# Patient Record
Sex: FEMALE | Race: WHITE | NOT HISPANIC OR LATINO | Employment: UNEMPLOYED | URBAN - METROPOLITAN AREA
[De-identification: names, ages, dates, MRNs, and addresses within clinical notes are randomized per-mention and may not be internally consistent; named-entity substitution may affect disease eponyms.]

---

## 2019-11-08 ENCOUNTER — OFFICE VISIT CONVERTED (OUTPATIENT)
Dept: CARDIOLOGY | Facility: CLINIC | Age: 24
End: 2019-11-08
Attending: INTERNAL MEDICINE

## 2019-11-08 ENCOUNTER — CONVERSION ENCOUNTER (OUTPATIENT)
Dept: CARDIOLOGY | Facility: CLINIC | Age: 24
End: 2019-11-08

## 2020-09-17 ENCOUNTER — OFFICE VISIT CONVERTED (OUTPATIENT)
Dept: CARDIOLOGY | Facility: CLINIC | Age: 25
End: 2020-09-17
Attending: INTERNAL MEDICINE

## 2020-09-18 ENCOUNTER — OFFICE VISIT CONVERTED (OUTPATIENT)
Dept: FAMILY MEDICINE CLINIC | Facility: CLINIC | Age: 25
End: 2020-09-18
Attending: FAMILY MEDICINE

## 2021-05-13 NOTE — PROGRESS NOTES
"   Progress Note      Patient Name: Katty Love   Patient ID: [id number]   Sex: Female   YOB: 1995        Visit Date: September 17, 2020    Provider: Theo Green MD   [facility]   Location Address: [address] [place] [address], [address]   [place]  [id number]   Location Phone: [phone]          Chief Complaint  · Atrial flutter       History Of Present Illness  REFERRING CARE PROVIDER:   Katty Love is a 25 year old /White female with paroxysmal atrial flutter, previous cardioversion. No symptomatic recurrences. Occasional palpitation issues.   PAST MEDICAL HISTORY: Atrial flutter.   FAMILY HISTORY: Negative for diabetes, hypertension and heart disease.   PSYCHOSOCIAL HISTORY: No history of mood changes or depression. She rarely drinks alcohol and never used tobacco.   CURRENT MEDICATIONS: None.       Review of Systems  · Cardiovascular  o Admits  o : palpitations (fast, fluttering, or skipping beats)  o Denies  o : swelling (feet, ankles, hands), shortness of breath while walking or lying flat, chest pain or angina pectoris   · Respiratory  o Denies  o : chronic or frequent cough, asthma or wheezing      Vitals  Date Time BP Position Site L\R Cuff Size HR RR TEMP (F) WT  HT  BMI kg/m2 BSA m2 O2 Sat HC       09/17/2020 08:27 /76 Sitting    70 - R   187lbs 0oz 5'  7\" 29.29 2           Physical Examination  · Constitutional  o Appearance  o : Awake, alert, in no acute distress.   · Eyes  o Conjunctivae  o : Normal.  · Ears, Nose, Mouth and Throat  o Oral Cavity  o :   § Oral Mucosa  § : Normal.  · Neck  o Inspection/Palpation  o : No JVD. Good carotid upstroke. No thyromegaly.  · Respiratory  o Respiratory  o : Good respiratory effort. Clear to percussion and auscultation.  · Cardiovascular  o Heart  o :   § Auscultation of Heart  § : S1, S2 normal. Regular rate and rhythm without murmurs, gallops, or rubs.  o Peripheral Vascular System  o : "   § Extremities  § : Good femoral and pedal pulses. No pedal edema.  · Gastrointestinal  o Abdominal Examination  o : Soft. No tenderness or masses felt. No hepatosplenomegaly. Abdominal aorta is not palpable.     EKG was performed in the office today.  Indication:       palpitations.  Results:          normal sinus rhythm.  Comparison:    No change from prior EKGs.             Assessment     ASSESSMENT AND PLAN:    Paroxysmal atrial flutter:  No recurrent episodes.  Just continue with monitoring.  Encouraged exercise.    MD Abi Bradley/maryjane         This note was transcribed by Sanjuana Javier.  maryjane/abi   The above service was transcribed by Sanjuana Javier, and I attest to the accuracy of the note.  ABI.         Plan  · Medications  o Medications have been Reconciled  o Transition of Care or Provider Policy            Electronically Signed by: Sanjuana Javier-, -Author on September 23, 2020 05:44:07 AM  Electronically Co-signed by: Theo Green MD -Reviewer on September 23, 2020 10:05:18 AM

## 2021-05-13 NOTE — PROGRESS NOTES
Progress Note      Patient Name: Katty Love   Patient ID: [id number]   Sex: Female   YOB: 1995    Primary Care Provider: Mannie Lockett DO   Referring Provider: Mannie Lockett DO    Visit Date: September 18, 2020    Provider: Mannie Lockett DO   Location: [facility]   Location Address: [address] [place] [address] [address] [place] [address]  [id number]   Location Phone: [phone]          Chief Complaint  · Pt here to establish care and wants to have knots on her leg checked out.      History Of Present Illness  Katty Love is a 25 year old /White female who presents for evaluation and treatment of:        Patient presents establish care complaining of a few small bumps on her lower extremity.  One is on her right thigh one on her left hip that is been there for over 5 years all round like a cyst no pain redness has been unchanged since she noticed to have years ago.    Other past medical history is atrial flutter follows with cardiology has had cardioversion and was on Xarelto in the past follows with Dr. Green.  No problems currently on the medications and denies any palpitation chest pain shortness of breath or other.    She is , is at home with her children 1 is a stepchild that stays with her mother and they visit on the weekends, 12 6 and 2 years old.  No tobacco or substance use       Past Medical History  Disease Name Date Onset Notes   Allergies --  --    Atrial flutter --  --    Screening Mammogram 2019 --          Past Surgical History  Procedure Name Date Notes   Tubal ligation 2018 --        Allergies Reconciled  Social History  Finding Status Start/Stop Quantity Notes   Alcohol Current some day --/-- --  --    Homemaker --  --/-- --  --    lives with children --  --/-- --  --    lives with spouse --  --/-- --  --    Substance Abuse Never --/-- --  --    Tobacco Never --/-- --  --          Review of  "Systems  · Constitutional  o Denies  o : fatigue, night sweats  · Eyes  o Denies  o : double vision, blurred vision  · HENT  o Denies  o : vertigo, recent head injury  · Breasts  o Denies  o : abnormal changes in breast size, additional breast symptoms except as noted in the HPI  · Cardiovascular  o Denies  o : chest pain, irregular heart beats  · Respiratory  o Denies  o : shortness of breath, productive cough  · Gastrointestinal  o Denies  o : nausea, vomiting  · Genitourinary  o Denies  o : dysuria, urinary retention  · Integument  o Denies  o : hair growth change, new skin lesions  · Neurologic  o Denies  o : altered mental status, seizures  · Musculoskeletal  o Denies  o : joint swelling, limitation of motion  · Endocrine  o Denies  o : cold intolerance, heat intolerance  · Heme-Lymph  o Denies  o : petechiae, lymph node enlargement or tenderness  · Allergic-Immunologic  o Denies  o : frequent illnesses      Vitals  Date Time BP Position Site L\R Cuff Size HR RR TEMP (F) WT  HT  BMI kg/m2 BSA m2 O2 Sat        09/18/2020 09:26 /77 Sitting    86 - R 16 97.5 188lbs 8oz 5'  7\" 29.52 2.01 94 %          Physical Examination  · Constitutional  o Appearance  o : no acute distress, well-nourished  · Head and Face  o Head  o :   § Inspection  § : atraumatic, normocephalic  · Eyes  o Eyes  o : extraocular movements intact, no scleral icterus, no conjunctival injection  · Ears, Nose, Mouth and Throat  o Ears  o :   § External Ears  § : normal  o Nose  o :   § Intranasal Exam  § : nares patent  o Oral Cavity  o :   § Oral Mucosa  § : moist mucous membranes  · Neck  o Thyroid  o : gland size normal, nontender, no nodules or masses present on palpation, symmetric  · Respiratory  o Respiratory Effort  o : breathing comfortably, symmetric chest rise  o Auscultation of Lungs  o : clear to asculatation bilaterally, no wheezes, rales, or rhonchii  · Cardiovascular  o Heart  o :   § Auscultation of Heart  § : regular rate " and rhythm, no murmurs, rubs, or gallops  o Peripheral Vascular System  o :   § Extremities  § : no edema  · Lymphatic  o Neck  o : no lymphadenopathy present  · Skin and Subcutaneous Tissue  o General Inspection  o : no lesions present, no areas of discoloration, skin turgor normal  · Neurologic  o Mental Status Examination  o :   § Orientation  § : grossly oriented to person, place and time  o Gait and Station  o :   § Gait Screening  § : normal gait  · Psychiatric  o General  o : normal mood and affect              Assessment  · Screening for depression     V79.0/Z13.89  · Need for influenza vaccination     V04.81/Z23  · Atrial flutter     427.32/I48.92  · History of cardioversion     V15.1/Z98.890  · Benign cyst of skin     706.2/L72.9         Atrial flutter  *Controlled  continue with cardiology    Recommend flu shot today    Cyst on thigh and hip likely, consider ultrasound or follow-up if changes but likely benign since unchanged for 5 years    Follow-up annually for physical, consider labs if not in the past several years CBC CMP TSH lipid    *Up-to-date on Pap smear screening for depression is negative     Problems Reconciled  Plan  · Orders  o ACO-18: Negative screen for clinical depression using a standardized tool () - V79.0/Z13.89 - 09/18/2020  o Fluzone Quadrivalent Vaccine, age 6 months + (35367) - V04.81/Z23 - 09/18/2020   Vaccine - Influenza; Dose: 0.5; Site: Right Deltoid; Route: Intramuscular; Date: 09/18/2020 10:07:00; Exp: 06/30/2021; Lot: [id number]; Mfg: Unknown ; TradeName: Fluarix, quadrivalent, preservative free; Administered By: Otilia Bustos Other; Comment: Pt tolerated well  o Physical, Primary Care Panel (CBC, CMP, Lipid, TSH) [facility] (02686, [id number], 58272, [id number]) - 427.32/I48.92, 706.2/L72.9 - 09/18/2020   in the next year or so  o ACO-39: Current medications updated and reviewed () - 427.32/I48.92, V15.1/Z98.890 - 09/18/2020  o ACO-14: Influenza immunization administered  or previously received () - V04.81/Z23 - 09/18/2020  · Medications  o Medications have been Reconciled  o Transition of Care or Provider Policy  · Instructions  o Depression Screen completed and scanned into the EMR under the designated folder within the patient's documents.  o Today's PHQ-9 result is _0__  o Patient was educated/instructed on their diagnosis, treatment and medications prior to discharge from the clinic today.  o Patient instructed to seek medical attention urgently for new or worsening symptoms.  o Trusted Web sites were provided.  o Call the office with any concerns or questions.  o Bring all medicines with their bottles to each office visit.  o Risks, benefits, and alternatives were discussed with the patient. The patient is aware of risks associated with:  o Chronic conditions reviewed and taken into consideration for today's treatment plan.  o Electronically Identified Patient Education Materials Provided Electronically  · Disposition  o Call or Return if symptoms worsen or persist.  o Labs before follow up ordered  o Return Visit Request in/on 1 year +/- 2 days ([id number]).            Electronically Signed by: Mannie Lockett DO -Author on September 18, 2020 04:42:49 PM

## 2021-05-14 VITALS
SYSTOLIC BLOOD PRESSURE: 122 MMHG | WEIGHT: 188.5 LBS | TEMPERATURE: 97.5 F | BODY MASS INDEX: 29.58 KG/M2 | DIASTOLIC BLOOD PRESSURE: 77 MMHG | OXYGEN SATURATION: 94 % | RESPIRATION RATE: 16 BRPM | HEART RATE: 86 BPM | HEIGHT: 67 IN

## 2021-05-14 VITALS
SYSTOLIC BLOOD PRESSURE: 102 MMHG | BODY MASS INDEX: 29.35 KG/M2 | HEIGHT: 67 IN | DIASTOLIC BLOOD PRESSURE: 76 MMHG | WEIGHT: 187 LBS | HEART RATE: 70 BPM

## 2021-05-15 VITALS
BODY MASS INDEX: 36.12 KG/M2 | SYSTOLIC BLOOD PRESSURE: 130 MMHG | WEIGHT: 184 LBS | DIASTOLIC BLOOD PRESSURE: 78 MMHG | HEART RATE: 90 BPM | HEIGHT: 60 IN

## 2021-09-20 PROBLEM — I48.3 TYPICAL ATRIAL FLUTTER (HCC): Status: ACTIVE | Noted: 2021-09-20

## 2021-09-20 NOTE — PATIENT INSTRUCTIONS
Atrial Flutter    Atrial flutter is a type of abnormal heart rhythm (arrhythmia). The heart has an electrical system that tells it how to beat. In atrial flutter, the signals move rapidly in the top chambers of the heart (the atria). This makes your heart beat very fast. Atrial flutter can come and go, or it can be permanent.  The goal of treatment is to prevent blood clots from forming, control your heart rate, or restore your heartbeat to a normal rhythm. If this condition is not treated, it can cause serious problems, such as a weakened heart muscle (cardiomyopathy) or a stroke.  What are the causes?  This condition is often caused by conditions that damage the heart's electrical system. These include:  · Heart conditions and heart surgery. These include heart attacks and open-heart surgery.  · Lung problems, such as COPD or a blood clot in the lung (pulmonary embolism, or PE).  · Poorly controlled high blood pressure (hypertension).  · Overactive thyroid (hyperthyroidism).  · Diabetes.  In some cases, the cause of this condition is not known.  What increases the risk?  You are more likely to develop this condition if:  · You are an elderly adult.  · You are a man.  · You are overweight (obese).  · You have obstructive sleep apnea.  · You have a family history of atrial flutter.  · You have diabetes.  · You drink a lot of alcohol, especially binge drinking.  · You use drugs, including cannabis.  · You smoke.  What are the signs or symptoms?  Symptoms of this condition include:  · A feeling that your heart is pounding or racing (palpitations).  · Shortness of breath.  · Chest pain.  · Feeling dizzy or light-headed.  · Fainting.  · Low blood pressure (hypotension).  · Fatigue.  · Tiring easily during exercise or activity.  In some cases, there are no symptoms.  How is this diagnosed?  This condition may be diagnosed with:  · An electrocardiogram (ECG) to check electrical signals of the heart.  · An ambulatory  cardiac monitor to record your heart's activity for a few days.  · An echocardiogram to create pictures of your heart.  · A transesophageal echocardiogram (MARGARITA) to create even better pictures of your heart.  · A stress test to check your blood supply while you exercise.  · Imaging tests, such as a CT scan or chest X-ray.  · Blood tests.  How is this treated?  Treatment depends on underlying conditions and how you feel when you experience atrial flutter. This condition may be treated with:  · Medicines to prevent blood clots or to treat heart rate or heart rhythm problems.  · Electrical cardioversion to reset the heart's rhythm.  · Ablation to remove the heart tissue that sends abnormal signals.  · Left atrial appendage closure to seal the area where blood clots can form.  In some cases, underlying conditions will be treated.  Follow these instructions at home:  Medicines  · Take over-the-counter and prescription medicines only as told by your health care provider.  · Do not take any new medicines without talking to your health care provider.  · If you are taking blood thinners:  ? Talk with your health care provider before you take any medicines that contain aspirin or NSAIDs, such as ibuprofen. These medicines increase your risk for dangerous bleeding.  ? Take your medicine exactly as told, at the same time every day.  ? Avoid activities that could cause injury or bruising, and follow instructions about how to prevent falls.  ? Wear a medical alert bracelet or carry a card that lists what medicines you take.  Lifestyle  · Eat heart-healthy foods. Talk with a dietitian to make an eating plan that is right for you.  · Do not use any products that contain nicotine or tobacco, such as cigarettes, e-cigarettes, and chewing tobacco. If you need help quitting, ask your health care provider.  · Do not drink alcohol.  · Do not use drugs, including cannabis.  · Lose weight if you are overweight or obese.  · Exercise  "regularly as instructed by your health care provider.  General instructions  · Do not use diet pills unless your health care provider approves. Diet pills may make heart problems worse.  · If you have obstructive sleep apnea, manage your condition as told by your health care provider.  · Keep all follow-up visits as told by your health care provider. This is important.  Contact a health care provider if you:  · Notice a change in the rate, rhythm, or strength of your heartbeat.  · Are taking a blood thinner and you notice more bruising.  · Have a sudden change in weight.  · Tire more easily when you exercise or do heavy work.  Get help right away if you have:  · Pain or pressure in your chest.  · Shortness of breath.  · Fainting.  · Increasing sweating with no known cause.  · Side effects of blood thinners, such as blood in your vomit, stool, or urine, or bleeding that cannot stop.  · Any symptoms of a stroke. \"BE FAST\" is an easy way to remember the main warning signs of a stroke:  ? B - Balance. Signs are dizziness, sudden trouble walking, or loss of balance.  ? E - Eyes. Signs are trouble seeing or a sudden change in vision.  ? F - Face. Signs are sudden weakness or numbness of the face, or the face or eyelid drooping on one side.  ? A - Arms. Signs are weakness or numbness in an arm. This happens suddenly and usually on one side of the body.  ? S - Speech. Signs are sudden trouble speaking, slurred speech, or trouble understanding what people say.  ? T - Time. Time to call emergency services. Write down what time symptoms started.  · Other signs of a stroke, such as:  ? A sudden, severe headache with no known cause.  ? Nausea or vomiting.  ? Seizure.  · These symptoms may represent a serious problem that is an emergency. Do not wait to see if the symptoms will go away. Get medical help right away. Call your local emergency services (911 in the [place] Do not drive yourself to the hospital.  Summary  · Atrial " flutter is an abnormal heart rhythm that can give you symptoms of palpitations, shortness of breath, or fatigue.  · Atrial flutter is often treated with medicines to keep your heart in a normal rhythm and to prevent a stroke.  · Get help right away if you cannot catch your breath, or have chest pain or pressure.  · Get help right away if you have signs or symptoms of a stroke.  This information is not intended to replace advice given to you by your health care provider. Make sure you discuss any questions you have with your health care provider.  Document Revised: 06/10/2020 Document Reviewed: 06/10/2020  Elsevier Patient Education © 2021 [employer]

## 2021-09-21 ENCOUNTER — OFFICE VISIT (OUTPATIENT)
Dept: CARDIOLOGY | Facility: CLINIC | Age: 26
End: 2021-09-21

## 2021-09-21 VITALS
HEIGHT: 67 IN | HEART RATE: 82 BPM | DIASTOLIC BLOOD PRESSURE: 86 MMHG | SYSTOLIC BLOOD PRESSURE: 133 MMHG | WEIGHT: 179 LBS | BODY MASS INDEX: 28.09 KG/M2

## 2021-09-21 DIAGNOSIS — I48.3 TYPICAL ATRIAL FLUTTER (HCC): Primary | ICD-10-CM

## 2021-09-21 PROCEDURE — 99213 OFFICE O/P EST LOW 20 MIN: CPT | Performed by: INTERNAL MEDICINE

## 2021-09-21 NOTE — ASSESSMENT & PLAN NOTE
No recurrent episodes continue with observation and avoidance of caffeinated products repeat EKG next visit.  Also encourage exercise program

## 2021-09-21 NOTE — PROGRESS NOTES
"Chief Complaint  Atrial Flutter    Subjective    He was doing well no recurrent tachycardia problems no new issues or complaints    Past Medical History:   Diagnosis Date   • Abnormal ECG    • Allergies    • Arrhythmia    • Other screening mammogram    • Paroxysmal atrial flutter  2021    Previous cardioversion  10/06/19 echo: 1.  Normal left ventricular systolic function and size, calculated LVEF of 55-60%.  2.  No hemodynamically significant valvular lesions.         No current outpatient medications on file.    There are no discontinued medications.  No Known Allergies     Social History     Tobacco Use   • Smoking status: Former Smoker     Types: Cigarettes     Start date:      Quit date:      Years since quittin.7   • Smokeless tobacco: Never Used   • Tobacco comment: 2 cigarettes a day when she did smoke   Vaping Use   • Vaping Use: Never used   Substance Use Topics   • Alcohol use: Yes     Comment: occassionally   • Drug use: Never       Family History   Problem Relation Age of Onset   • Hypertension Mother    • Hypertension Father         Objective     /86   Pulse 82   Ht 170.2 cm (67\")   Wt 81.2 kg (179 lb)   BMI 28.04 kg/m²       Physical Exam    General Appearance:   · no acute distress  · Alert and oriented x3  HENT:   · lips not cyanotic  · Atraumatic  Neck:  · No jvd   · supple  Respiratory:  · no respiratory distress  · normal breath sounds  · no rales  Cardiovascular:  · Regular rate and rhythm  · no S3, no S4   · no murmur  · no rub  Extremities  · No cyanosis  · lower extremity edema: none    Skin:   · warm, dry  · No rashes      Result Review :     No results found for: PROBNP       Lab Results   Component Value Date    TSH 2.100 10/05/2019      Lab Results   Component Value Date    FREET4 1.1 10/05/2019      No results found for: DDIMERQUANT  Magnesium   Date Value Ref Range Status   10/05/2019 1.68 1.60 - 2.30 mg/dL Final      No results found for: DIGOXIN   Lab " Results   Component Value Date    TROPONINT <0.01 10/06/2019             Lab Results   Component Value Date    POCTROP 0.00 10/05/2019                      Diagnoses and all orders for this visit:    1. Paroxysmal atrial flutter  (Primary)  Assessment & Plan:  No recurrent episodes continue with observation and avoidance of caffeinated products repeat EKG next visit.  Also encourage exercise program            Follow Up     Return in about 1 year (around 9/21/2022) for Follow with Aysha Seth, EKG with F/U.          Patient was given instructions and counseling regarding her condition or for health maintenance advice. Please see specific information pulled into the AVS if appropriate.

## 2022-09-21 ENCOUNTER — OFFICE VISIT (OUTPATIENT)
Dept: CARDIOLOGY | Facility: CLINIC | Age: 27
End: 2022-09-21

## 2022-09-21 VITALS
BODY MASS INDEX: 29.82 KG/M2 | WEIGHT: 190 LBS | HEIGHT: 67 IN | HEART RATE: 65 BPM | DIASTOLIC BLOOD PRESSURE: 82 MMHG | SYSTOLIC BLOOD PRESSURE: 128 MMHG

## 2022-09-21 DIAGNOSIS — I48.3 TYPICAL ATRIAL FLUTTER: Primary | ICD-10-CM

## 2022-09-21 PROCEDURE — 99213 OFFICE O/P EST LOW 20 MIN: CPT | Performed by: NURSE PRACTITIONER

## 2022-09-21 NOTE — PROGRESS NOTES
Chief Complaint  Paroxysmal atrial flutter    Subjective            History of Present Illness  Katty Love is a 23-year-old white/ female patient who presents to the office today for follow-up.  She has paroxysmal atrial flutter for which she underwent cardioversion on 10/6/2019.  She monitors her heart rate and rhythm routinely with [email or web address] smart watch. She has noticed resting heart rate in the 90's at times and some occasional palpitations which occur on a monthly basis.  She denies recurrent atrial flutter episodes.  She denies chest pain, shortness of breath, lightheadedness/dizziness, or edema.    PMH  Past Medical History:   Diagnosis Date   • Abnormal ECG    • Allergies    • Arrhythmia    • Other screening mammogram 2019   • Paroxysmal atrial flutter  9/20/2021    Previous cardioversion  10/06/19 echo: 1.  Normal left ventricular systolic function and size, calculated LVEF of 55-60%.  2.  No hemodynamically significant valvular lesions.           ALLERGY  No Known Allergies       SURGICALHX  Past Surgical History:   Procedure Laterality Date   • ESSURE TUBAL LIGATION  2018   • TUBAL ABDOMINAL LIGATION            SOC  Social History     Socioeconomic History   • Marital status:    Tobacco Use   • Smoking status: Former Smoker     Types: Cigarettes     Start date: 2011     Quit date: 2012     Years since quitting: 10.7   • Smokeless tobacco: Never Used   • Tobacco comment: 2 cigarettes a day when she did smoke   Vaping Use   • Vaping Use: Never used   Substance and Sexual Activity   • Alcohol use: Yes     Comment: occassionally   • Drug use: Never   • Sexual activity: Defer         FAMHX  Family History   Problem Relation Age of Onset   • Hypertension Mother    • Hypertension Father           MEDSIGONLY  No current outpatient medications on file prior to visit.     No current facility-administered medications on file prior to visit.         Objective   /82   Pulse 65   Ht  "170.2 cm (67\")   Wt 86.2 kg (190 lb)   BMI 29.76 kg/m²       Physical Exam  HENT:      Head: Normocephalic.   Neck:      Vascular: No carotid bruit.   Cardiovascular:      Rate and Rhythm: Normal rate and regular rhythm.      Pulses: Normal pulses.      Heart sounds: Normal heart sounds. No murmur heard.  Pulmonary:      Effort: Pulmonary effort is normal.      Breath sounds: Normal breath sounds.   Musculoskeletal:      Cervical back: Neck supple.      Right lower leg: No edema.      Left lower leg: No edema.   Skin:     General: Skin is dry.      Capillary Refill: Capillary refill takes less than 2 seconds.   Neurological:      Mental Status: She is alert and oriented to person, place, and time.   Psychiatric:         Behavior: Behavior normal.       ECG 12 Lead    Date/Time: 9/21/2022 9:56 AM  Performed by: Aysha Seth APRN  Authorized by: Aysha Seth APRN   Comparison: compared with previous ECG   Similar to previous ECG  Rhythm: sinus rhythm  Rate: normal  BPM: 70  Conduction: conduction normal  ST Segments: ST segments normal  T Waves: T waves normal  QRS axis: normal  Other: no other findings    Clinical impression: normal ECG          Result Review :   The following data was reviewed by: SAMANTHA Marquez on 09/21/2022:  No results found for: PROBNP       Lab Results   Component Value Date    TSH 2.100 10/05/2019      Lab Results   Component Value Date    FREET4 1.1 10/05/2019      No results found for: DDIMERQUANT  Magnesium   Date Value Ref Range Status   10/05/2019 1.68 1.60 - 2.30 mg/dL Final      No results found for: DIGOXIN   Lab Results   Component Value Date    TROPONINT <0.01 10/06/2019           10/06/2019 echo:  1.  Normal left ventricular systolic function and size, calculated LVEF of 55-60%.    2.  No hemodynamically significant valvular lesions.        Assessment and Plan    Diagnoses and all orders for this visit:    1. Paroxysmal atrial flutter  (Primary)  EKG in " office today shows sinus rhythm with heart rate of 70 bpm.  Continue to monitor.  Avoid caffeinated products.    Other orders  -     ECG 12 Lead            Follow Up   Return in about 1 year (around 9/21/2023) for Follow up with Dr Green.    Patient was given instructions and counseling regarding her condition or for health maintenance advice. Please see specific information pulled into the AVS if appropriate.     Katty Love  reports that she quit smoking about 10 years ago. Her smoking use included cigarettes. She started smoking about 11 years ago. She has never used smokeless tobacco.           SAMANTHA Marquez  09/21/22  10:00 EDT    Dictated Utilizing Dragon Dictation

## 2022-11-14 PROBLEM — Z01.419 WELL WOMAN EXAM: Status: ACTIVE | Noted: 2022-11-14

## 2022-12-19 ENCOUNTER — OFFICE VISIT (OUTPATIENT)
Dept: OBSTETRICS AND GYNECOLOGY | Facility: CLINIC | Age: 27
End: 2022-12-19

## 2022-12-19 VITALS
WEIGHT: 190 LBS | DIASTOLIC BLOOD PRESSURE: 72 MMHG | HEIGHT: 67 IN | SYSTOLIC BLOOD PRESSURE: 116 MMHG | BODY MASS INDEX: 29.82 KG/M2

## 2022-12-19 DIAGNOSIS — Z01.419 WELL WOMAN EXAM: Primary | ICD-10-CM

## 2022-12-19 PROCEDURE — 99395 PREV VISIT EST AGE 18-39: CPT | Performed by: OBSTETRICS & GYNECOLOGY

## 2022-12-19 PROCEDURE — G0123 SCREEN CERV/VAG THIN LAYER: HCPCS | Performed by: OBSTETRICS & GYNECOLOGY

## 2022-12-19 NOTE — PROGRESS NOTES
"Well Woman Visit    CC: FREDERIC    HPI:   27 y.o. who presents for a well woman exam. Menses q month lasting 4-5 days. Slightly heavier this year. No other problems.    History: PMHx, Meds, Allergies, PSHx, Social Hx, and POBHx all reviewed and updated    /72   Ht 170.2 cm (67\")   Wt 86.2 kg (190 lb)   LMP 2022 (Approximate)   BMI 29.76 kg/m²     Physical Exam  Vitals and nursing note reviewed. Exam conducted with a chaperone present.   Constitutional:       Appearance: Normal appearance.   HENT:      Head: Normocephalic and atraumatic.   Eyes:      Extraocular Movements: Extraocular movements intact.   Neck:      Thyroid: No thyroid mass or thyromegaly.   Chest:   Breasts:     Breasts are symmetrical.      Right: Normal. No swelling, bleeding, inverted nipple, mass, nipple discharge, skin change or tenderness.      Left: Normal. No swelling, bleeding, inverted nipple, mass, nipple discharge, skin change or tenderness.   Abdominal:      General: There is no distension.      Palpations: Abdomen is soft. There is no mass.      Tenderness: There is no abdominal tenderness.      Hernia: There is no hernia in the left inguinal area or right inguinal area.   Genitourinary:     General: Normal vulva.      Exam position: Lithotomy position.      Pubic Area: No rash.       Labia:         Right: No rash, tenderness, lesion or injury.         Left: No rash, tenderness, lesion or injury.       Urethra: No prolapse, urethral pain or urethral lesion.      Vagina: Normal. No vaginal discharge, tenderness or prolapsed vaginal walls.      Cervix: Normal.      Uterus: Normal.       Adnexa: Right adnexa normal and left adnexa normal.   Lymphadenopathy:      Upper Body:      Right upper body: No supraclavicular or axillary adenopathy.      Left upper body: No supraclavicular or axillary adenopathy.   Skin:     General: Skin is warm and dry.   Neurological:      Mental Status: She is alert and oriented to person, " place, and time.   Psychiatric:         Mood and Affect: Mood normal.         Behavior: Behavior normal.         Thought Content: Thought content normal.         ASSESSMENT AND PLAN:   Diagnoses and all orders for this visit:    1. Well woman exam (Primary)  Assessment & Plan:  Pap  Recommend daily multivitamin with folic acid    Orders:  -     IGP,rfx Aptima HPV All Pth; Future  -     IGP,rfx Aptima HPV All Pth      Counseling:     All BC Options R/B/A/SE/E of each reviewed    Preventative:   Recommend FLU vaccine this season, R/B discussed  Recommend COVID vaccine, R/B discussed    She understands the importance of having any ordered tests to be performed in a timely fashion.  The risks of not performing them include, but are not limited to, advanced cancer stages, bone loss from osteoporosis and/or subsequent increase in morbidity and/or mortality.  She is encouraged to review her results online and/or contact or office if she has questions.     Follow Up:  Return for Annual physical.    Blake Kirkpatrick MD  12/19/2022

## 2022-12-28 LAB
CONV .: NORMAL
CYTOLOGIST CVX/VAG CYTO: NORMAL
CYTOLOGY CVX/VAG DOC CYTO: NORMAL
CYTOLOGY CVX/VAG DOC THIN PREP: NORMAL
DX ICD CODE: NORMAL
HIV 1 & 2 AB SER-IMP: NORMAL
OTHER STN SPEC: NORMAL
STAT OF ADQ CVX/VAG CYTO-IMP: NORMAL

## 2023-09-21 ENCOUNTER — OFFICE VISIT (OUTPATIENT)
Dept: CARDIOLOGY | Facility: CLINIC | Age: 28
End: 2023-09-21
Payer: COMMERCIAL

## 2023-09-21 VITALS
BODY MASS INDEX: 28.63 KG/M2 | HEIGHT: 67 IN | HEART RATE: 79 BPM | DIASTOLIC BLOOD PRESSURE: 93 MMHG | WEIGHT: 182.38 LBS | SYSTOLIC BLOOD PRESSURE: 147 MMHG

## 2023-09-21 DIAGNOSIS — I48.3 TYPICAL ATRIAL FLUTTER: Primary | ICD-10-CM

## 2023-09-21 DIAGNOSIS — R03.0 PREHYPERTENSION: ICD-10-CM

## 2023-09-21 PROCEDURE — 99213 OFFICE O/P EST LOW 20 MIN: CPT | Performed by: INTERNAL MEDICINE

## 2023-09-21 NOTE — PROGRESS NOTES
"Chief Complaint  Follow-up (Paroxysmal atrial flutter/)    Subjective    Patient has been doing well no severe tachycardic recurrent issues or problems.  Denies any chest pain or increased shortness of breath  Past Medical History:   Diagnosis Date    Abnormal ECG     Allergies     Arrhythmia     Other screening mammogram 2019    Paroxysmal atrial flutter  2021    Previous cardioversion  10/06/19 echo: 1.  Normal left ventricular systolic function and size, calculated LVEF of 55-60%.  2.  No hemodynamically significant valvular lesions.         No current outpatient medications on file.    There are no discontinued medications.  No Known Allergies     Social History     Tobacco Use    Smoking status: Former     Types: Cigarettes     Start date: 2011     Quit date: 2012     Years since quittin.7     Passive exposure: Past    Smokeless tobacco: Never    Tobacco comments:     2 cigarettes a day when she did smoke   Vaping Use    Vaping Use: Never used   Substance Use Topics    Alcohol use: Not Currently     Comment: occassionally    Drug use: Never       Family History   Problem Relation Age of Onset    Hypertension Mother     Hypertension Father         Objective     /93   Pulse 79   Ht 170.2 cm (67\")   Wt 82.7 kg (182 lb 6 oz)   BMI 28.56 kg/m²       Physical Exam    General Appearance:   no acute distress  Alert and oriented x3  HENT:   lips not cyanotic  Atraumatic  Neck:  No jvd   supple  Respiratory:  no respiratory distress  normal breath sounds  no rales  Cardiovascular:  Regular rate and rhythm  no S3, no S4   no murmur  no rub  Extremities  No cyanosis  lower extremity edema: none    Skin:   warm, dry  No rashes      Result Review :     No results found for: PROBNP  CMP          7/15/2023    15:08   CMP   Glucose 103    BUN 10    Creatinine 0.88    EGFR 90.8    Sodium 142    Potassium 4.0    Chloride 105    Calcium 9.3    Total Protein 7.7    Albumin 4.4    Globulin 3.3    Total " Bilirubin 0.5    Alkaline Phosphatase 74    AST (SGOT) 39    ALT (SGPT) 51    Albumin/Globulin Ratio 1.3    BUN/Creatinine Ratio 11.4    Anion Gap 12.1      CBC w/diff          7/15/2023    15:08   CBC w/Diff   WBC 5.33    RBC 4.39    Hemoglobin 13.2    Hematocrit 38.3    MCV 87.2    MCH 30.1    MCHC 34.5    RDW 12.1    Platelets 203    Neutrophil Rel % 63.3    Immature Granulocyte Rel % 0.6    Lymphocyte Rel % 27.6    Monocyte Rel % 7.7    Eosinophil Rel % 0.6    Basophil Rel % 0.2       Lab Results   Component Value Date    TSH 1.570 07/15/2023      Lab Results   Component Value Date    FREET4 1.1 10/05/2019      No results found for: DDIMERQUANT  Magnesium   Date Value Ref Range Status   10/05/2019 1.68 1.60 - 2.30 mg/dL Final      No results found for: DIGOXIN   Lab Results   Component Value Date    TROPONINT <0.01 10/06/2019             Lab Results   Component Value Date    POCTROP 0.00 10/05/2019                      Diagnoses and all orders for this visit:    1. Paroxysmal atrial flutter  (Primary)  Assessment & Plan:  Patient with no recurrent episodes symptomatically stable encouraged exercise avoidance of alcohol.      2. Prehypertension  Assessment & Plan:  Blood pressure mildly elevated today in office could be somewhat whitecoat related recommended keeping a home blood pressure log for review.  Counseled patient on  low-sodium diet of less than 2 g  Aerobic activity 30 minutes a day 5 times a week  Weight loss              Follow Up     Return in about 1 year (around 9/21/2024) for EKG with F/U, Follow with Aysha Seth.          Patient was given instructions and counseling regarding her condition or for health maintenance advice. Please see specific information pulled into the AVS if appropriate.

## 2023-09-21 NOTE — ASSESSMENT & PLAN NOTE
Blood pressure mildly elevated today in office could be somewhat whitecoat related recommended keeping a home blood pressure log for review.  Counseled patient on  low-sodium diet of less than 2 g  Aerobic activity 30 minutes a day 5 times a week  Weight loss

## 2023-09-28 ENCOUNTER — LAB (OUTPATIENT)
Dept: LAB | Facility: HOSPITAL | Age: 28
End: 2023-09-28
Payer: COMMERCIAL

## 2023-09-28 ENCOUNTER — OFFICE VISIT (OUTPATIENT)
Dept: FAMILY MEDICINE CLINIC | Facility: CLINIC | Age: 28
End: 2023-09-28
Payer: COMMERCIAL

## 2023-09-28 VITALS
RESPIRATION RATE: 20 BRPM | BODY MASS INDEX: 28.47 KG/M2 | TEMPERATURE: 98 F | OXYGEN SATURATION: 100 % | HEART RATE: 80 BPM | DIASTOLIC BLOOD PRESSURE: 90 MMHG | SYSTOLIC BLOOD PRESSURE: 134 MMHG | WEIGHT: 181.4 LBS | HEIGHT: 67 IN

## 2023-09-28 DIAGNOSIS — H53.8 BLURRED VISION: ICD-10-CM

## 2023-09-28 DIAGNOSIS — R73.9 ELEVATED BLOOD SUGAR: ICD-10-CM

## 2023-09-28 DIAGNOSIS — R03.0 PREHYPERTENSION: ICD-10-CM

## 2023-09-28 DIAGNOSIS — J34.89 SINUS PRESSURE: ICD-10-CM

## 2023-09-28 DIAGNOSIS — R73.9 ELEVATED BLOOD SUGAR: Primary | ICD-10-CM

## 2023-09-28 DIAGNOSIS — I48.92 PAROXYSMAL ATRIAL FLUTTER: ICD-10-CM

## 2023-09-28 DIAGNOSIS — Z13.29 SCREENING FOR THYROID DISORDER: ICD-10-CM

## 2023-09-28 DIAGNOSIS — Z13.220 SCREENING, LIPID: ICD-10-CM

## 2023-09-28 LAB
ALBUMIN SERPL-MCNC: 4.5 G/DL (ref 3.5–5.2)
ALBUMIN/GLOB SERPL: 1.7 G/DL
ALP SERPL-CCNC: 69 U/L (ref 39–117)
ALT SERPL W P-5'-P-CCNC: 15 U/L (ref 1–33)
ANION GAP SERPL CALCULATED.3IONS-SCNC: 12 MMOL/L (ref 5–15)
AST SERPL-CCNC: 16 U/L (ref 1–32)
BASOPHILS # BLD AUTO: 0.01 10*3/MM3 (ref 0–0.2)
BASOPHILS NFR BLD AUTO: 0.2 % (ref 0–1.5)
BILIRUB SERPL-MCNC: 1.1 MG/DL (ref 0–1.2)
BUN SERPL-MCNC: 10 MG/DL (ref 6–20)
BUN/CREAT SERPL: 10.3 (ref 7–25)
CALCIUM SPEC-SCNC: 9.4 MG/DL (ref 8.6–10.5)
CHLORIDE SERPL-SCNC: 104 MMOL/L (ref 98–107)
CHOLEST SERPL-MCNC: 178 MG/DL (ref 0–200)
CO2 SERPL-SCNC: 23 MMOL/L (ref 22–29)
CREAT SERPL-MCNC: 0.97 MG/DL (ref 0.57–1)
DEPRECATED RDW RBC AUTO: 38.2 FL (ref 37–54)
EGFRCR SERPLBLD CKD-EPI 2021: 80.8 ML/MIN/1.73
EOSINOPHIL # BLD AUTO: 0.07 10*3/MM3 (ref 0–0.4)
EOSINOPHIL NFR BLD AUTO: 1.3 % (ref 0.3–6.2)
ERYTHROCYTE [DISTWIDTH] IN BLOOD BY AUTOMATED COUNT: 12 % (ref 12.3–15.4)
GLOBULIN UR ELPH-MCNC: 2.7 GM/DL
GLUCOSE SERPL-MCNC: 93 MG/DL (ref 65–99)
HBA1C MFR BLD: 5.2 % (ref 4.8–5.6)
HCT VFR BLD AUTO: 39.1 % (ref 34–46.6)
HDLC SERPL-MCNC: 42 MG/DL (ref 40–60)
HGB BLD-MCNC: 13 G/DL (ref 12–15.9)
IMM GRANULOCYTES # BLD AUTO: 0.01 10*3/MM3 (ref 0–0.05)
IMM GRANULOCYTES NFR BLD AUTO: 0.2 % (ref 0–0.5)
LDLC SERPL CALC-MCNC: 118 MG/DL (ref 0–100)
LDLC/HDLC SERPL: 2.76 {RATIO}
LYMPHOCYTES # BLD AUTO: 1.88 10*3/MM3 (ref 0.7–3.1)
LYMPHOCYTES NFR BLD AUTO: 35.7 % (ref 19.6–45.3)
MCH RBC QN AUTO: 29.1 PG (ref 26.6–33)
MCHC RBC AUTO-ENTMCNC: 33.2 G/DL (ref 31.5–35.7)
MCV RBC AUTO: 87.5 FL (ref 79–97)
MONOCYTES # BLD AUTO: 0.54 10*3/MM3 (ref 0.1–0.9)
MONOCYTES NFR BLD AUTO: 10.3 % (ref 5–12)
NEUTROPHILS NFR BLD AUTO: 2.75 10*3/MM3 (ref 1.7–7)
NEUTROPHILS NFR BLD AUTO: 52.3 % (ref 42.7–76)
NRBC BLD AUTO-RTO: 0 /100 WBC (ref 0–0.2)
PLATELET # BLD AUTO: 199 10*3/MM3 (ref 140–450)
PMV BLD AUTO: 11.6 FL (ref 6–12)
POTASSIUM SERPL-SCNC: 4 MMOL/L (ref 3.5–5.2)
PROT SERPL-MCNC: 7.2 G/DL (ref 6–8.5)
RBC # BLD AUTO: 4.47 10*6/MM3 (ref 3.77–5.28)
SODIUM SERPL-SCNC: 139 MMOL/L (ref 136–145)
T4 FREE SERPL-MCNC: 1.38 NG/DL (ref 0.93–1.7)
TRIGL SERPL-MCNC: 100 MG/DL (ref 0–150)
TSH SERPL DL<=0.05 MIU/L-ACNC: 2.01 UIU/ML (ref 0.27–4.2)
VLDLC SERPL-MCNC: 18 MG/DL (ref 5–40)
WBC NRBC COR # BLD: 5.26 10*3/MM3 (ref 3.4–10.8)

## 2023-09-28 PROCEDURE — 80061 LIPID PANEL: CPT

## 2023-09-28 PROCEDURE — 36415 COLL VENOUS BLD VENIPUNCTURE: CPT

## 2023-09-28 PROCEDURE — 99204 OFFICE O/P NEW MOD 45 MIN: CPT

## 2023-09-28 PROCEDURE — 80050 GENERAL HEALTH PANEL: CPT

## 2023-09-28 PROCEDURE — 84439 ASSAY OF FREE THYROXINE: CPT

## 2023-09-28 PROCEDURE — 83036 HEMOGLOBIN GLYCOSYLATED A1C: CPT

## 2023-09-28 RX ORDER — FLUTICASONE PROPIONATE 50 MCG
2 SPRAY, SUSPENSION (ML) NASAL DAILY
Qty: 16 G | Refills: 0 | Status: SHIPPED | OUTPATIENT
Start: 2023-09-28

## 2023-09-28 RX ORDER — AMOXICILLIN AND CLAVULANATE POTASSIUM 875; 125 MG/1; MG/1
1 TABLET, FILM COATED ORAL 2 TIMES DAILY
Qty: 14 TABLET | Refills: 0 | Status: SHIPPED | OUTPATIENT
Start: 2023-09-28 | End: 2023-10-05

## 2023-09-28 NOTE — PROGRESS NOTES
Chief Complaint   Patient presents with    Blurred Vision     Has been happening for a little over a week.    head congestion     Establish Care       Subjective          Katty Love presents to [facility]    History of Present Illness  Katty is here to establish care. She has not been going to see anything in a long time. Last person she saw was here over 3 years ago. She has been having blurred vision on the left side and has some head congestion. Her BP has been running high for a couple weeks. She sees Dr. Green and was advised to not take any decongestants because of her BP. Today it is 134/90. I have advised that we get some lab work and can treat her sinus pressure/congestion with antibiotics to see if the blurry vision goes away. If not we can discuss getting a CT next. I have also advised she sees her eye doctor to have her eye looked at as well.     Past History:  Medical History: has a past medical history of Abnormal ECG, Allergies, Arrhythmia, Other screening mammogram (2019), and Paroxysmal atrial flutter  (09/20/2021).   Surgical History: has a past surgical history that includes Tubal ligation (Bilateral, 2018) and Cardioversion.   Family History: family history includes Hypertension in her father and mother.   Social History: reports that she quit smoking about 11 years ago. Her smoking use included cigarettes. She started smoking about 12 years ago. She smoked an average of 1 pack per day. She has been exposed to tobacco smoke. She has never used smokeless tobacco. She reports that she does not currently use alcohol. She reports that she does not use drugs.  Allergies: Patient has no known allergies.  (Not in a hospital admission)       Social History     Socioeconomic History    Marital status:    Tobacco Use    Smoking status: Former     Packs/day: 1.00     Types: Cigarettes     Start date: 1/1/2011     Quit date: 1/1/2012     Years since  "quittin.7     Passive exposure: Past    Smokeless tobacco: Never    Tobacco comments:     2 cigarettes a day when she did smoke   Vaping Use    Vaping Use: Never used   Substance and Sexual Activity    Alcohol use: Not Currently     Comment: occassionally    Drug use: Never    Sexual activity: Yes     Partners: Male     Birth control/protection: Tubal ligation       Health Maintenance Due   Topic Date Due    BMI FOLLOWUP  Never done    HEPATITIS C SCREENING  Never done    ANNUAL PHYSICAL  Never done       Objective     Vital Signs:   /90   Pulse 80   Temp 98 °F (36.7 °C) (Tympanic)   Resp 20   Ht 170.2 cm (67\")   Wt 82.3 kg (181 lb 6.4 oz)   SpO2 100%   BMI 28.41 kg/m²       Physical Exam  Constitutional:       Appearance: Normal appearance.   HENT:      Nose: Nose normal.      Mouth/Throat:      Mouth: Mucous membranes are moist.   Cardiovascular:      Rate and Rhythm: Normal rate and regular rhythm.   Pulmonary:      Effort: Pulmonary effort is normal.      Breath sounds: Normal breath sounds.   Skin:     General: Skin is warm and dry.   Neurological:      General: No focal deficit present.      Mental Status: She is alert and oriented to person, place, and time.   Psychiatric:         Mood and Affect: Mood normal.         Behavior: Behavior normal.        Review of Systems   HENT:  Positive for sinus pressure.    Eyes:  Positive for blurred vision.   All other systems reviewed and are negative.     Result Review :                 Assessment and Plan    Diagnoses and all orders for this visit:    1. Elevated blood sugar (Primary)  -     Hemoglobin A1c; Future    2. Blurred vision  -     CBC w AUTO Differential; Future  -     Comprehensive metabolic panel; Future  -     Hemoglobin A1c; Future    3. Screening for thyroid disorder  -     TSH+Free T4; Future    4. Paroxysmal atrial flutter  -     CBC w AUTO Differential; Future  -     Comprehensive metabolic panel; Future    5. " Prehypertension  Comments:  134/90 today. Advised to take BP daily and keep a BP log    6. Sinus pressure  -     amoxicillin-clavulanate (AUGMENTIN) 875-125 MG per tablet; Take 1 tablet by mouth 2 (Two) Times a Day for 7 days.  Dispense: 14 tablet; Refill: 0  -     fluticasone (FLONASE) 50 MCG/ACT nasal spray; 2 sprays into the nostril(s) as directed by provider Daily.  Dispense: 16 g; Refill: 0    7. Screening, lipid  -     Lipid panel; Future          Pt thought to be clinically stable at this time.    Follow Up   Return if symptoms worsen or fail to improve.  Patient was given instructions and counseling regarding her condition or for health maintenance advice. Please see specific information pulled into the AVS if appropriate.

## 2023-10-19 ENCOUNTER — TELEPHONE (OUTPATIENT)
Dept: FAMILY MEDICINE CLINIC | Facility: CLINIC | Age: 28
End: 2023-10-19
Payer: COMMERCIAL

## 2023-10-20 NOTE — TELEPHONE ENCOUNTER
Left message for patient to make an appt. We will need to see her and do UA before medication can be sent. OK for HUB to relay above message and schedule appt.

## 2023-10-21 PROCEDURE — 87077 CULTURE AEROBIC IDENTIFY: CPT | Performed by: FAMILY MEDICINE

## 2023-10-21 PROCEDURE — 87086 URINE CULTURE/COLONY COUNT: CPT | Performed by: FAMILY MEDICINE

## 2023-10-21 PROCEDURE — 87186 SC STD MICRODIL/AGAR DIL: CPT | Performed by: FAMILY MEDICINE

## 2023-10-23 ENCOUNTER — TELEPHONE (OUTPATIENT)
Dept: URGENT CARE | Facility: CLINIC | Age: 28
End: 2023-10-23
Payer: COMMERCIAL

## 2023-10-23 DIAGNOSIS — R30.0 DYSURIA: Primary | ICD-10-CM

## 2023-10-23 RX ORDER — NITROFURANTOIN 25; 75 MG/1; MG/1
100 CAPSULE ORAL 2 TIMES DAILY
Qty: 10 CAPSULE | Refills: 0 | Status: SHIPPED | OUTPATIENT
Start: 2023-10-23 | End: 2023-10-28

## 2023-10-23 NOTE — TELEPHONE ENCOUNTER
Called patient to review urine culture results, no answer, message left for patient to return my call today.

## 2023-10-23 NOTE — TELEPHONE ENCOUNTER
Patient called back to the office, urine culture results reviewed, patient states that she is feeling some better today, but is willing to change antibiotics due to the sensitivity report showing that the infection is resistant to Bactrim.  Patient to stop Bactrim today and start Macrobid.  Patient to follow-up with PCP as needed or if symptoms worsen or persist.  The patient states that she has no known drug allergies and denies any chance of pregnancy.  She verbalizes understanding.

## 2023-12-19 NOTE — PROGRESS NOTES
"Well Woman Visit    CC: FREDERIC     HPI:   28 y.o. who presents for a well woman exam.  She reports that her menstrual cycles are regular occurring once a month.  They last about 5 days in length.  Moderate flow.  She reports over the last 3 months she has had some light spotting after intercourse.  She states that this usually only lasts for that day and is gone by the next day.  No pain or other unusual symptoms.  No vaginal discharge or odor.    History: PMHx, Meds, Allergies, PSHx, Social Hx, and POBHx all reviewed and updated.    /100   Pulse 85   Ht 170.2 cm (67\")   Wt 84.4 kg (186 lb)   LMP 2023   Breastfeeding No   BMI 29.13 kg/m²     Physical Exam  Vitals and nursing note reviewed. Exam conducted with a chaperone present.   Constitutional:       General: She is not in acute distress.     Appearance: Normal appearance. She is not ill-appearing.   HENT:      Head: Normocephalic and atraumatic.   Eyes:      Extraocular Movements: Extraocular movements intact.   Neck:      Thyroid: No thyroid mass or thyromegaly.   Cardiovascular:      Rate and Rhythm: Regular rhythm.      Heart sounds: No murmur heard.  Pulmonary:      Effort: Pulmonary effort is normal.      Breath sounds: No wheezing.   Chest:   Breasts:     Right: No mass, nipple discharge or tenderness.      Left: No mass, nipple discharge or tenderness.   Abdominal:      General: There is no distension.      Palpations: Abdomen is soft. There is no mass.      Tenderness: There is no abdominal tenderness.   Genitourinary:     General: Normal vulva.      Labia:         Right: No lesion.         Left: No lesion.       Urethra: No urethral pain or urethral lesion.      Vagina: Normal. No vaginal discharge, tenderness or prolapsed vaginal walls.      Cervix: Normal.      Uterus: Normal.       Adnexa: Right adnexa normal and left adnexa normal.      Rectum: Normal.   Skin:     General: Skin is warm and dry.   Neurological:      Mental " Status: She is alert and oriented to person, place, and time.   Psychiatric:         Mood and Affect: Mood normal.         Behavior: Behavior normal.         Thought Content: Thought content normal.         ASSESSMENT AND PLAN:    Diagnoses and all orders for this visit:    1. Well woman exam (Primary)  Assessment & Plan:  Pap  Recommend daily multivitamin with folic acid    Orders:  -     IGP,rfx Aptima HPV All Pth    2. Postcoital bleeding  Assessment & Plan:  Check vaginitis panel and cultures.  If negative then the patient needs reevaluation and possible application of silver nitrate to the endocervical canal.    Orders:  -     Chlamydia trachomatis, Neisseria gonorrhoeae, PCR - Swab, Cervix  -     Gardnerella vaginalis, Trichomonas vaginalis, Candida albicans, DNA - Swab, Cervix        Counseling:     Track menses, RTO IF <q21d, >7d long, or heavy    Preventative:   Recommend FLU vaccine this season, R/B discussed  Recommend COVID vaccine, R/B discussed    She understands the importance of having any ordered tests to be performed in a timely fashion.  The risks of not performing them include, but are not limited to, advanced cancer stages, bone loss from osteoporosis and/or subsequent increase in morbidity and/or mortality.  She is encouraged to review her results online and/or contact or office if she has questions.     Follow Up:  Return for Annual physical.    Blake Kirkpatrick MD  12/20/2023

## 2023-12-20 ENCOUNTER — OFFICE VISIT (OUTPATIENT)
Dept: OBSTETRICS AND GYNECOLOGY | Facility: CLINIC | Age: 28
End: 2023-12-20
Payer: COMMERCIAL

## 2023-12-20 VITALS
HEIGHT: 67 IN | HEART RATE: 85 BPM | SYSTOLIC BLOOD PRESSURE: 162 MMHG | DIASTOLIC BLOOD PRESSURE: 100 MMHG | BODY MASS INDEX: 29.19 KG/M2 | WEIGHT: 186 LBS

## 2023-12-20 DIAGNOSIS — N93.0 POSTCOITAL BLEEDING: ICD-10-CM

## 2023-12-20 DIAGNOSIS — Z01.419 WELL WOMAN EXAM: Primary | ICD-10-CM

## 2023-12-20 LAB
C TRACH RRNA CVX QL NAA+PROBE: NOT DETECTED
CANDIDA SPECIES: NEGATIVE
GARDNERELLA VAGINALIS: NEGATIVE
N GONORRHOEA RRNA SPEC QL NAA+PROBE: NOT DETECTED
T VAGINALIS DNA VAG QL PROBE+SIG AMP: NEGATIVE

## 2023-12-20 PROCEDURE — 99395 PREV VISIT EST AGE 18-39: CPT | Performed by: OBSTETRICS & GYNECOLOGY

## 2023-12-20 PROCEDURE — 87660 TRICHOMONAS VAGIN DIR PROBE: CPT | Performed by: OBSTETRICS & GYNECOLOGY

## 2023-12-20 PROCEDURE — 87480 CANDIDA DNA DIR PROBE: CPT | Performed by: OBSTETRICS & GYNECOLOGY

## 2023-12-20 PROCEDURE — 87491 CHLMYD TRACH DNA AMP PROBE: CPT | Performed by: OBSTETRICS & GYNECOLOGY

## 2023-12-20 PROCEDURE — 87591 N.GONORRHOEAE DNA AMP PROB: CPT | Performed by: OBSTETRICS & GYNECOLOGY

## 2023-12-20 PROCEDURE — 87510 GARDNER VAG DNA DIR PROBE: CPT | Performed by: OBSTETRICS & GYNECOLOGY

## 2023-12-20 NOTE — ASSESSMENT & PLAN NOTE
Check vaginitis panel and cultures.  If negative then the patient needs reevaluation and possible application of silver nitrate to the endocervical canal.

## 2023-12-21 ENCOUNTER — TELEPHONE (OUTPATIENT)
Dept: OBSTETRICS AND GYNECOLOGY | Facility: CLINIC | Age: 28
End: 2023-12-21
Payer: COMMERCIAL

## 2023-12-21 NOTE — TELEPHONE ENCOUNTER
Left a message to let her know her culture was negative and she needs to return for an appointment if she has any further bleeding after intercourse. HUB TO RELAY

## 2023-12-21 NOTE — TELEPHONE ENCOUNTER
----- Message from Blkae Kirkpatrick MD sent at 12/20/2023  3:45 PM EST -----  Please notify the patient that her screening for vaginal infections was all negative.  If she has any further bleeding after intercourse I would like her to schedule a follow-up visit.

## 2023-12-22 ENCOUNTER — TELEPHONE (OUTPATIENT)
Dept: CARDIOLOGY | Facility: CLINIC | Age: 28
End: 2023-12-22
Payer: COMMERCIAL

## 2023-12-22 NOTE — TELEPHONE ENCOUNTER
Received VM from patient.EDWIN patient. Patient states sometimes her BP is elevated and she can tell. Patient does not check BP when it feels it is elevated. Advised to check BP twice day and to call or send [employer] message on Tuesday. Patient voiced understanding .

## 2024-07-17 ENCOUNTER — TELEPHONE (OUTPATIENT)
Dept: OBSTETRICS AND GYNECOLOGY | Facility: CLINIC | Age: 29
End: 2024-07-17
Payer: COMMERCIAL

## 2024-09-23 ENCOUNTER — PATIENT ROUNDING (BHMG ONLY) (OUTPATIENT)
Dept: URGENT CARE | Facility: CLINIC | Age: 29
End: 2024-09-23
Payer: COMMERCIAL

## 2024-09-24 ENCOUNTER — TELEPHONE (OUTPATIENT)
Dept: CARDIOLOGY | Facility: CLINIC | Age: 29
End: 2024-09-24
Payer: COMMERCIAL

## 2024-09-24 RX ORDER — HYDROCHLOROTHIAZIDE 12.5 MG/1
12.5 TABLET ORAL DAILY
Qty: 90 TABLET | OUTPATIENT
Start: 2024-09-24

## 2024-09-24 RX ORDER — HYDROCHLOROTHIAZIDE 12.5 MG/1
12.5 TABLET ORAL DAILY
Qty: 30 TABLET | Refills: 0 | Status: SHIPPED | OUTPATIENT
Start: 2024-09-24

## 2024-09-24 RX ORDER — HYDROCHLOROTHIAZIDE 12.5 MG/1
12.5 TABLET ORAL DAILY
COMMUNITY
End: 2024-09-24 | Stop reason: SDUPTHER

## 2024-10-18 ENCOUNTER — OFFICE VISIT (OUTPATIENT)
Dept: CARDIOLOGY | Facility: CLINIC | Age: 29
End: 2024-10-18
Payer: COMMERCIAL

## 2024-10-18 VITALS
SYSTOLIC BLOOD PRESSURE: 139 MMHG | BODY MASS INDEX: 28.12 KG/M2 | HEART RATE: 76 BPM | WEIGHT: 179.2 LBS | HEIGHT: 67 IN | DIASTOLIC BLOOD PRESSURE: 94 MMHG

## 2024-10-18 DIAGNOSIS — I48.3 TYPICAL ATRIAL FLUTTER: Primary | ICD-10-CM

## 2024-10-18 DIAGNOSIS — R03.0 PREHYPERTENSION: ICD-10-CM

## 2024-10-18 PROCEDURE — 93000 ELECTROCARDIOGRAM COMPLETE: CPT | Performed by: NURSE PRACTITIONER

## 2024-10-18 PROCEDURE — 99214 OFFICE O/P EST MOD 30 MIN: CPT | Performed by: NURSE PRACTITIONER

## 2024-10-18 RX ORDER — HYDROCHLOROTHIAZIDE 25 MG/1
25 TABLET ORAL DAILY
Qty: 90 TABLET | Refills: 1 | Status: SHIPPED | OUTPATIENT
Start: 2024-10-18

## 2024-10-18 NOTE — PROGRESS NOTES
Chief Complaint  Follow-up and Atrial Flutter    Subjective            History of Present Illness  Katty Love is a 29-year-old female patient who presents to the office today for follow-up.  She has atrial flutter and prehypertension.  She had cardioversion in 2019 and denies any recurrence of atrial flutter since then.  She is currently prescribed hydrochlorothiazide 12.5 mg daily.  She is compliant with this medicine.  She denies any new or worsening cardiac symptoms today.    PMH  Past Medical History:   Diagnosis Date    Abnormal ECG     Allergies     Arrhythmia     Other screening mammogram     Paroxysmal atrial flutter  2021    Previous cardioversion  10/06/19 echo: 1.  Normal left ventricular systolic function and size, calculated LVEF of 55-60%.  2.  No hemodynamically significant valvular lesions.           ALLERGY  No Known Allergies       SURGICALHX  Past Surgical History:   Procedure Laterality Date    CARDIOVERSION      TUBAL ABDOMINAL LIGATION Bilateral           SOC  Social History     Socioeconomic History    Marital status:     Number of children: 2   Tobacco Use    Smoking status: Former     Current packs/day: 0.00     Average packs/day: 1 pack/day for 1 year (1.0 ttl pk-yrs)     Types: Cigarettes     Start date: 2011     Quit date: 2012     Years since quittin.8     Passive exposure: Past    Smokeless tobacco: Never    Tobacco comments:     2 cigarettes a day when she did smoke   Vaping Use    Vaping status: Never Used   Substance and Sexual Activity    Alcohol use: Not Currently     Comment: occassionally    Drug use: Never    Sexual activity: Yes     Partners: Male     Birth control/protection: Tubal ligation         FAMHX  Family History   Problem Relation Age of Onset    Hypertension Father     Hypertension Mother     Breast cancer Neg Hx     Ovarian cancer Neg Hx     Uterine cancer Neg Hx     Cervical cancer Neg Hx     Colon cancer Neg Hx      "Stomach cancer Neg Hx     Skin cancer Neg Hx     Malig Hyperthermia Neg Hx     Clotting disorder Neg Hx           MEDSIGONLY  Current Outpatient Medications on File Prior to Visit   Medication Sig    fluticasone (FLONASE) 50 MCG/ACT nasal spray 2 sprays into the nostril(s) as directed by provider Daily.    hydroCHLOROthiazide 12.5 MG tablet Take 1 tablet by mouth Daily.     No current facility-administered medications on file prior to visit.         Objective   /94   Pulse 76   Ht 170.2 cm (67.01\")   Wt 81.3 kg (179 lb 3.2 oz)   BMI 28.06 kg/m²       Physical Exam  HENT:      Head: Normocephalic.   Neck:      Vascular: No carotid bruit.   Cardiovascular:      Rate and Rhythm: Normal rate and regular rhythm.      Pulses: Normal pulses.      Heart sounds: Normal heart sounds. No murmur heard.  Pulmonary:      Effort: Pulmonary effort is normal.      Breath sounds: Normal breath sounds.   Musculoskeletal:      Cervical back: Neck supple.      Right lower leg: No edema.      Left lower leg: No edema.   Skin:     General: Skin is dry.   Neurological:      Mental Status: She is alert and oriented to person, place, and time.   Psychiatric:         Behavior: Behavior normal.       ECG 12 Lead    Date/Time: 10/18/2024 10:31 AM  Performed by: Aysha Seth APRN    Authorized by: Aysha Seth APRN  Comparison: compared with previous ECG from 7/15/2023  Similar to previous ECG  Rhythm: sinus rhythm  Rate: normal  BPM: 75  Conduction: conduction normal  ST Segments: ST segments normal  T Waves: T waves normal  QRS axis: normal  Other: no other findings    Clinical impression: normal ECG        Result Review :   The following data was reviewed by: SAMANTHA Marquez on 10/18/2024:  No results found for: \"PROBNP\"       Lab Results   Component Value Date    TSH 2.010 09/28/2023      Lab Results   Component Value Date    FREET4 1.38 09/28/2023      No results found for: \"DDIMERQUANT\"  Magnesium   Date " "Value Ref Range Status   10/05/2019 1.68 1.60 - 2.30 mg/dL Final      No results found for: \"DIGOXIN\"   Lab Results   Component Value Date    TROPONINT <0.01 10/06/2019           [id number] Score: 1      Assessment and Plan    Diagnoses and all orders for this visit:    1. Paroxysmal atrial flutter  (Primary)  She continues to maintain normal sinus rhythm, continue to monitor with serial EKGs.    2. Prehypertension  Her blood pressure is mildly elevated in office today.  Increase hydrochlorothiazide dose to 25 mg daily. Check blood pressure twice a day for the next two weeks, blood pressure log provided for patient.  Will review log once available to me will make any necessary medication changes at that time.    Other orders  -     hydroCHLOROthiazide 25 MG tablet; Take 1 tablet by mouth Daily.  Dispense: 90 tablet; Refill: 1  -     ECG 12 Lead            Follow Up   Return in about 3 months (around 1/18/2025) for Follow up with Dr Green.    Patient was given instructions and counseling regarding her condition or for health maintenance advice. Please see specific information pulled into the AVS if appropriate.     Katty Liebermananjelica Love  reports that she quit smoking about 12 years ago. Her smoking use included cigarettes. She started smoking about 13 years ago. She has a 1 pack-year smoking history. She has been exposed to tobacco smoke. She has never used smokeless tobacco.          SAMANTHA Marquez  10/18/24  11:40 EDT    Dictated Utilizing Dragon Dictation    "

## 2024-10-26 NOTE — ASSESSMENT & PLAN NOTE
I sent the patient a mychart   Patient with no recurrent episodes symptomatically stable encouraged exercise avoidance of alcohol.

## 2025-01-23 ENCOUNTER — OFFICE VISIT (OUTPATIENT)
Dept: CARDIOLOGY | Facility: CLINIC | Age: 30
End: 2025-01-23
Payer: COMMERCIAL

## 2025-01-23 VITALS
HEIGHT: 67 IN | HEART RATE: 80 BPM | WEIGHT: 173.4 LBS | SYSTOLIC BLOOD PRESSURE: 130 MMHG | BODY MASS INDEX: 27.21 KG/M2 | DIASTOLIC BLOOD PRESSURE: 80 MMHG

## 2025-01-23 DIAGNOSIS — I48.3 TYPICAL ATRIAL FLUTTER: Primary | ICD-10-CM

## 2025-01-23 DIAGNOSIS — I10 ESSENTIAL HYPERTENSION: ICD-10-CM

## 2025-01-23 PROCEDURE — 99213 OFFICE O/P EST LOW 20 MIN: CPT | Performed by: INTERNAL MEDICINE

## 2025-01-23 NOTE — PROGRESS NOTES
"Chief Complaint  Follow-up    Subjective    Patient is doing well symptomatically denies any tachycardic symptoms  Past Medical History:   Diagnosis Date    Abnormal ECG     Allergies     Arrhythmia     Essential hypertension 2023    Other screening mammogram 2019    Paroxysmal atrial flutter  2021    Previous cardioversion  10/06/19 echo: 1.  Normal left ventricular systolic function and size, calculated LVEF of 55-60%.  2.  No hemodynamically significant valvular lesions.           Current Outpatient Medications:     hydroCHLOROthiazide 25 MG tablet, Take 1 tablet by mouth Daily., Disp: 90 tablet, Rfl: 1    Medications Discontinued During This Encounter   Medication Reason    fluticasone (FLONASE) 50 MCG/ACT nasal spray *Therapy completed     No Known Allergies     Social History     Tobacco Use    Smoking status: Former     Current packs/day: 0.00     Average packs/day: 1 pack/day for 1 year (1.0 ttl pk-yrs)     Types: Cigarettes     Start date: 2011     Quit date: 2012     Years since quittin.0     Passive exposure: Past    Smokeless tobacco: Never    Tobacco comments:     2 cigarettes a day when she did smoke   Vaping Use    Vaping status: Never Used   Substance Use Topics    Alcohol use: Not Currently     Comment: occassionally    Drug use: Never       Family History   Problem Relation Age of Onset    Hypertension Father     Hypertension Mother     Breast cancer Neg Hx     Ovarian cancer Neg Hx     Uterine cancer Neg Hx     Cervical cancer Neg Hx     Colon cancer Neg Hx     Stomach cancer Neg Hx     Skin cancer Neg Hx     Malig Hyperthermia Neg Hx     Clotting disorder Neg Hx         Objective     /80 (BP Location: Right arm, Patient Position: Sitting, Cuff Size: Adult)   Pulse 80   Ht 170.2 cm (67\")   Wt 78.7 kg (173 lb 6.4 oz)   BMI 27.16 kg/m²       Physical Exam    General Appearance:   no acute distress  Alert and oriented x3  HENT:   lips not " "cyanotic  Atraumatic  Neck:  No jvd   supple  Respiratory:  no respiratory distress  normal breath sounds  no rales  Cardiovascular:  Regular rate and rhythm  no S3, no S4   no murmur  no rub  Extremities  No cyanosis  lower extremity edema: none    Skin:   warm, dry  No rashes      Result Review :     No results found for: \"PROBNP\"       Lab Results   Component Value Date    TSH 2.010 09/28/2023      Lab Results   Component Value Date    FREET4 1.38 09/28/2023      No results found for: \"DDIMERQUANT\"  Magnesium   Date Value Ref Range Status   10/05/2019 1.68 1.60 - 2.30 mg/dL Final      No results found for: \"DIGOXIN\"   Lab Results   Component Value Date    TROPONINT <0.01 10/06/2019             Lab Results   Component Value Date    POCTROP 0.00 10/05/2019                      Diagnoses and all orders for this visit:    1. Paroxysmal atrial flutter  (Primary)  Assessment & Plan:  Maintain symptomatic normal sinus rhythm repeat EKG next visit      2. Essential hypertension  Assessment & Plan:  Continued HCTZ 25 daily dosing               Follow Up     Return in about 1 year (around 1/23/2026) for Follow with Aysha Seth, EKG with F/U.          Patient was given instructions and counseling regarding her condition or for health maintenance advice. Please see specific information pulled into the AVS if appropriate.       "

## 2025-04-18 ENCOUNTER — OFFICE VISIT (OUTPATIENT)
Dept: FAMILY MEDICINE CLINIC | Facility: CLINIC | Age: 30
End: 2025-04-18
Payer: COMMERCIAL

## 2025-04-18 ENCOUNTER — LAB (OUTPATIENT)
Dept: LAB | Facility: HOSPITAL | Age: 30
End: 2025-04-18
Payer: COMMERCIAL

## 2025-04-18 VITALS
BODY MASS INDEX: 27.47 KG/M2 | WEIGHT: 175 LBS | HEART RATE: 77 BPM | SYSTOLIC BLOOD PRESSURE: 126 MMHG | DIASTOLIC BLOOD PRESSURE: 87 MMHG | OXYGEN SATURATION: 100 % | HEIGHT: 67 IN | TEMPERATURE: 98.4 F

## 2025-04-18 DIAGNOSIS — I10 ESSENTIAL HYPERTENSION: ICD-10-CM

## 2025-04-18 DIAGNOSIS — Z51.81 MEDICATION MONITORING ENCOUNTER: ICD-10-CM

## 2025-04-18 DIAGNOSIS — I10 ESSENTIAL HYPERTENSION: Primary | Chronic | ICD-10-CM

## 2025-04-18 DIAGNOSIS — Z13.29 SCREENING FOR THYROID DISORDER: ICD-10-CM

## 2025-04-18 DIAGNOSIS — Z13.220 SCREENING, LIPID: ICD-10-CM

## 2025-04-18 LAB
ALBUMIN SERPL-MCNC: 4.2 G/DL (ref 3.5–5.2)
ALBUMIN/GLOB SERPL: 1.2 G/DL
ALP SERPL-CCNC: 58 U/L (ref 39–117)
ALT SERPL W P-5'-P-CCNC: 13 U/L (ref 1–33)
ANION GAP SERPL CALCULATED.3IONS-SCNC: 12.2 MMOL/L (ref 5–15)
AST SERPL-CCNC: 13 U/L (ref 1–32)
BASOPHILS # BLD AUTO: 0.02 10*3/MM3 (ref 0–0.2)
BASOPHILS NFR BLD AUTO: 0.4 % (ref 0–1.5)
BILIRUB SERPL-MCNC: 0.9 MG/DL (ref 0–1.2)
BUN SERPL-MCNC: 10 MG/DL (ref 6–20)
BUN/CREAT SERPL: 10.4 (ref 7–25)
CALCIUM SPEC-SCNC: 9.3 MG/DL (ref 8.6–10.5)
CHLORIDE SERPL-SCNC: 100 MMOL/L (ref 98–107)
CHOLEST SERPL-MCNC: 201 MG/DL (ref 0–200)
CO2 SERPL-SCNC: 25.8 MMOL/L (ref 22–29)
CREAT SERPL-MCNC: 0.96 MG/DL (ref 0.57–1)
DEPRECATED RDW RBC AUTO: 38.4 FL (ref 37–54)
EGFRCR SERPLBLD CKD-EPI 2021: 80.8 ML/MIN/1.73
EOSINOPHIL # BLD AUTO: 0.06 10*3/MM3 (ref 0–0.4)
EOSINOPHIL NFR BLD AUTO: 1.1 % (ref 0.3–6.2)
ERYTHROCYTE [DISTWIDTH] IN BLOOD BY AUTOMATED COUNT: 11.9 % (ref 12.3–15.4)
GLOBULIN UR ELPH-MCNC: 3.4 GM/DL
GLUCOSE SERPL-MCNC: 94 MG/DL (ref 65–99)
HCT VFR BLD AUTO: 40 % (ref 34–46.6)
HDLC SERPL-MCNC: 49 MG/DL (ref 40–60)
HGB BLD-MCNC: 13.8 G/DL (ref 12–15.9)
IMM GRANULOCYTES # BLD AUTO: 0.01 10*3/MM3 (ref 0–0.05)
IMM GRANULOCYTES NFR BLD AUTO: 0.2 % (ref 0–0.5)
LDLC SERPL CALC-MCNC: 135 MG/DL (ref 0–100)
LDLC/HDLC SERPL: 2.72 {RATIO}
LYMPHOCYTES # BLD AUTO: 1.81 10*3/MM3 (ref 0.7–3.1)
LYMPHOCYTES NFR BLD AUTO: 34.2 % (ref 19.6–45.3)
MCH RBC QN AUTO: 30.6 PG (ref 26.6–33)
MCHC RBC AUTO-ENTMCNC: 34.5 G/DL (ref 31.5–35.7)
MCV RBC AUTO: 88.7 FL (ref 79–97)
MONOCYTES # BLD AUTO: 0.47 10*3/MM3 (ref 0.1–0.9)
MONOCYTES NFR BLD AUTO: 8.9 % (ref 5–12)
NEUTROPHILS NFR BLD AUTO: 2.93 10*3/MM3 (ref 1.7–7)
NEUTROPHILS NFR BLD AUTO: 55.2 % (ref 42.7–76)
NRBC BLD AUTO-RTO: 0 /100 WBC (ref 0–0.2)
PLATELET # BLD AUTO: 247 10*3/MM3 (ref 140–450)
PMV BLD AUTO: 11.9 FL (ref 6–12)
POTASSIUM SERPL-SCNC: 3.5 MMOL/L (ref 3.5–5.2)
PROT SERPL-MCNC: 7.6 G/DL (ref 6–8.5)
RBC # BLD AUTO: 4.51 10*6/MM3 (ref 3.77–5.28)
SODIUM SERPL-SCNC: 138 MMOL/L (ref 136–145)
T4 FREE SERPL-MCNC: 1.26 NG/DL (ref 0.92–1.68)
TRIGL SERPL-MCNC: 94 MG/DL (ref 0–150)
TSH SERPL DL<=0.05 MIU/L-ACNC: 2.21 UIU/ML (ref 0.27–4.2)
VLDLC SERPL-MCNC: 17 MG/DL (ref 5–40)
WBC NRBC COR # BLD AUTO: 5.3 10*3/MM3 (ref 3.4–10.8)

## 2025-04-18 PROCEDURE — 80061 LIPID PANEL: CPT

## 2025-04-18 PROCEDURE — 84439 ASSAY OF FREE THYROXINE: CPT

## 2025-04-18 PROCEDURE — 80050 GENERAL HEALTH PANEL: CPT

## 2025-04-18 PROCEDURE — 36415 COLL VENOUS BLD VENIPUNCTURE: CPT

## 2025-04-18 RX ORDER — HYDROCHLOROTHIAZIDE 25 MG/1
25 TABLET ORAL DAILY
Qty: 90 TABLET | Refills: 1 | Status: SHIPPED | OUTPATIENT
Start: 2025-04-18

## 2025-04-18 RX ORDER — MULTIPLE VITAMINS W/ MINERALS TAB 9MG-400MCG
1 TAB ORAL DAILY
COMMUNITY

## 2025-04-18 RX ORDER — HYDROCHLOROTHIAZIDE 25 MG/1
25 TABLET ORAL DAILY
Qty: 90 TABLET | Refills: 1 | OUTPATIENT
Start: 2025-04-18

## 2025-04-18 NOTE — PROGRESS NOTES
Chief Complaint     Establish Care and Blurred Vision (Pt complains of dry eyes)    Patient or patient representative verbalized consent for the use of Ambient Listening during the visit with  SAMANTHA Junior for chart documentation. 4/18/2025  09:12 EDT    History of Present Illness     Katty Love is a 30 y.o. female who presents to [facility] .    History of Present Illness  She is here today to be seen for medication monitoring. She was started on Hydrochlorothiazide last fall from Cardiology for primary hypertension and she has done well on it. She is down to having 8 pills.     She has been having some blurry vision here and there and thinks it might be her allergies but her mother was just diagnosed with type 2 diabetes so she wanted to check her glucose on labs and make sure that was ok.          History      Past Medical History:   Diagnosis Date    Abnormal ECG     Allergies     Arrhythmia     Essential hypertension 9/21/2023    Other screening mammogram 2019    Paroxysmal atrial flutter  09/20/2021    Previous cardioversion  10/06/19 echo: 1.  Normal left ventricular systolic function and size, calculated LVEF of 55-60%.  2.  No hemodynamically significant valvular lesions.         Past Surgical History:   Procedure Laterality Date    CARDIOVERSION      TUBAL ABDOMINAL LIGATION Bilateral 2018       Family History   Problem Relation Age of Onset    Hypertension Father     Hypertension Mother     Breast cancer Neg Hx     Ovarian cancer Neg Hx     Uterine cancer Neg Hx     Cervical cancer Neg Hx     Colon cancer Neg Hx     Stomach cancer Neg Hx     Skin cancer Neg Hx     Malig Hyperthermia Neg Hx     Clotting disorder Neg Hx         Current Medications        Current Outpatient Medications:     hydroCHLOROthiazide 25 MG tablet, Take 1 tablet by mouth Daily., Disp: 90 tablet, Rfl: 1    multivitamin with minerals tablet tablet, Take 1 tablet by mouth  "Daily., Disp: , Rfl:      Allergies     No Known Allergies    Social History       Social History     Social History Narrative    Not on file       Immunizations     Immunization:  Immunization History   Administered Date(s) Administered    Fluzone Quad >6mos (Multi-dose) 09/18/2020    Influenza, Unspecified 09/18/2020    Tdap 04/29/2024          Objective     Objective     Vital Signs:   /87 (BP Location: Right arm, Patient Position: Sitting, Cuff Size: Adult)   Pulse 77   Temp 98.4 °F (36.9 °C) (Temporal)   Ht 170.2 cm (67\")   Wt 79.4 kg (175 lb)   SpO2 100%   BMI 27.41 kg/m²       Physical Exam  Constitutional:       Appearance: Normal appearance.   HENT:      Nose: Nose normal.      Mouth/Throat:      Mouth: Mucous membranes are moist.   Cardiovascular:      Rate and Rhythm: Normal rate and regular rhythm.   Pulmonary:      Effort: Pulmonary effort is normal.      Breath sounds: Normal breath sounds.   Skin:     General: Skin is warm and dry.   Neurological:      General: No focal deficit present.      Mental Status: She is alert and oriented to person, place, and time.   Psychiatric:         Mood and Affect: Mood normal.         Behavior: Behavior normal.         Physical Exam        Results    The following data was reviewed by: SAMANTHA Junior on 04/18/2025:          Results         Assessment and Plan        Assessment and Plan    Diagnoses and all orders for this visit:    1. Essential hypertension (Primary)  -     hydroCHLOROthiazide 25 MG tablet; Take 1 tablet by mouth Daily.  Dispense: 90 tablet; Refill: 1  -     Comprehensive metabolic panel; Future  -     CBC w AUTO Differential; Future    2. Medication monitoring encounter  -     Comprehensive metabolic panel; Future  -     CBC w AUTO Differential; Future    3. Screening for thyroid disorder  -     TSH+Free T4; Future    4. Screening, lipid  -     Lipid panel; Future        Assessment & Plan          Follow Up        Follow Up   No " follow-ups on file.  Patient was given instructions and counseling regarding her condition or for health maintenance advice. Please see specific information pulled into the AVS if appropriate.

## 2025-04-18 NOTE — TELEPHONE ENCOUNTER
Rx Refill Note  Requested Prescriptions     Pending Prescriptions Disp Refills    hydroCHLOROthiazide 25 MG tablet [Pharmacy Med Name: HYDROCHLOROTHIAZIDE 25MGTABLETS] 90 tablet 1     Sig: TAKE 1 TABLET BY MOUTH DAILY        LAST OFFICE VISIT:  10/18/2024     NEXT OFFICE VISIT:  1/26/2026     Does the medication requests match the last office note:    [] Yes   [] No    Does this refill request meet protocol details for MA to approve:     [] Yes   [] No   [] No Protocols Provided    MED WAS REFILLED TODAY BY SAMANTHA ZARATE.  REFUSING THIS REFILL REQUEST.

## 2025-04-28 ENCOUNTER — OFFICE VISIT (OUTPATIENT)
Dept: OBSTETRICS AND GYNECOLOGY | Age: 30
End: 2025-04-28
Payer: COMMERCIAL

## 2025-04-28 VITALS
SYSTOLIC BLOOD PRESSURE: 111 MMHG | HEART RATE: 85 BPM | DIASTOLIC BLOOD PRESSURE: 71 MMHG | HEIGHT: 67 IN | WEIGHT: 181 LBS | BODY MASS INDEX: 28.41 KG/M2

## 2025-04-28 DIAGNOSIS — Z01.419 WELL WOMAN EXAM: Primary | ICD-10-CM

## 2025-04-28 DIAGNOSIS — Z80.3 FAMILY HISTORY OF BREAST CANCER: ICD-10-CM

## 2025-04-28 PROBLEM — N93.0 POSTCOITAL BLEEDING: Status: RESOLVED | Noted: 2023-12-20 | Resolved: 2025-04-28

## 2025-04-28 PROCEDURE — 99395 PREV VISIT EST AGE 18-39: CPT | Performed by: OBSTETRICS & GYNECOLOGY

## 2025-04-28 PROCEDURE — 99459 PELVIC EXAMINATION: CPT | Performed by: OBSTETRICS & GYNECOLOGY

## 2025-04-28 PROCEDURE — G0123 SCREEN CERV/VAG THIN LAYER: HCPCS | Performed by: OBSTETRICS & GYNECOLOGY

## 2025-04-28 NOTE — PROGRESS NOTES
"Well Woman Visit    CC: Well woman visit    Subjective:   30 y.o. who presents for a well woman exam. No problems today. Reports mensis is q month lasting 5-7 days.  She reports that her sister who is 38 was recently diagnosed with breast cancer.  That sister had genetic testing which was negative.    Last PAP:   Last Completed Pap Smear            Awaiting Completion       PAP SMEAR (Every 3 Years) Order placed this encounter      2025  Order placed for IGP,rfx Aptima HPV All Pth by Blake Kirkpatrick MD    2023  IGP,rfx Aptima HPV All Pth    2022  IGP,rfx Aptima HPV All Pth                          Last MMG:   Last Completed Mammogram    This patient has no relevant Health Maintenance data.          History: PMHx, Meds, Allergies, PSHx, Social Hx, and POBHx all reviewed and updated.    /71   Pulse 85   Ht 170.2 cm (67\")   Wt 82.1 kg (181 lb)   LMP 2025 (Exact Date)   Breastfeeding No   BMI 28.35 kg/m²     Physical Exam  Vitals and nursing note reviewed. Exam conducted with a chaperone present.   Constitutional:       General: She is not in acute distress.     Appearance: Normal appearance. She is not ill-appearing.   HENT:      Head: Normocephalic and atraumatic.      Mouth/Throat:      Mouth: Mucous membranes are moist.      Pharynx: Oropharynx is clear.   Eyes:      Extraocular Movements: Extraocular movements intact.   Neck:      Thyroid: No thyroid mass or thyromegaly.   Chest:   Breasts:     Breasts are symmetrical.      Right: Normal. No swelling, bleeding, inverted nipple, mass, nipple discharge, skin change or tenderness.      Left: Normal. No swelling, bleeding, inverted nipple, mass, nipple discharge, skin change or tenderness.   Abdominal:      General: There is no distension.      Palpations: Abdomen is soft. There is no mass.      Tenderness: There is no abdominal tenderness.      Hernia: There is no hernia in the left inguinal area or right inguinal " area.   Genitourinary:     General: Normal vulva.      Exam position: Lithotomy position.      Pubic Area: No rash.       Labia:         Right: No rash, tenderness, lesion or injury.         Left: No rash, tenderness, lesion or injury.       Urethra: No prolapse, urethral pain or urethral lesion.      Vagina: Normal. No vaginal discharge, erythema, tenderness, bleeding or prolapsed vaginal walls.      Cervix: Normal. No friability, lesion, erythema or cervical bleeding.      Uterus: Normal. Not enlarged, not fixed, not tender and no uterine prolapse.       Adnexa:         Right: No mass or tenderness.          Left: No mass or tenderness.     Musculoskeletal:         General: No swelling.      Right lower leg: No edema.      Left lower leg: No edema.   Lymphadenopathy:      Upper Body:      Right upper body: No supraclavicular or axillary adenopathy.      Left upper body: No supraclavicular or axillary adenopathy.   Skin:     General: Skin is warm and dry.      Findings: No rash.   Neurological:      Mental Status: She is alert and oriented to person, place, and time.   Psychiatric:         Mood and Affect: Mood normal.         Behavior: Behavior normal.         Thought Content: Thought content normal.         Assessment and Plan:  Diagnoses and all orders for this visit:    1. Well woman exam (Primary)  Assessment & Plan:  Pap  Recommend daily multivitamin with folic acid    Orders:  -     IGP,rfx Aptima HPV All Pth    2. Family history of breast cancer  Overview:  Paternal aunt  Sister age 38    Assessment & Plan:  Given her prescription was 38 years old the time of her diagnosis I would recommend early screening for the patient.  Will start with mammogram.  I would recommend this be done yearly    Orders:  -     Mammo Screening Digital Tomosynthesis Bilateral With CAD; Future        Counseling:     Track menses, RTO IF <q21d, >7d long, or heavy    Preventative:   MMG  Recommend FLU vaccine this season, R/B  discussed  s/p COVID vaccine    She understands the importance of having any ordered tests to be performed in a timely fashion.  The risks of not performing them include, but are not limited to, advanced cancer stages, bone loss from osteoporosis and/or subsequent increase in morbidity and/or mortality.  She is encouraged to review her results online and/or contact or office if she has questions.     Follow Up:  Return for Annual physical.    Blake Kirkpatrick MD  04/28/2025

## 2025-04-28 NOTE — ASSESSMENT & PLAN NOTE
Given her prescription was 36 years old the time of her diagnosis I would recommend early screening for the patient.  Will start with mammogram.  I would recommend this be done yearly

## 2025-04-30 LAB
CONV .: NORMAL
CYTOLOGIST CVX/VAG CYTO: NORMAL
CYTOLOGY CVX/VAG DOC CYTO: NORMAL
CYTOLOGY CVX/VAG DOC THIN PREP: NORMAL
DX ICD CODE: NORMAL
OTHER STN SPEC: NORMAL
SERVICE CMNT-IMP: NORMAL
STAT OF ADQ CVX/VAG CYTO-IMP: NORMAL

## 2025-06-04 ENCOUNTER — HOSPITAL ENCOUNTER (OUTPATIENT)
Dept: MAMMOGRAPHY | Facility: HOSPITAL | Age: 30
Discharge: HOME | End: 2025-06-04
Admitting: OBSTETRICS & GYNECOLOGY
Payer: COMMERCIAL

## 2025-06-04 DIAGNOSIS — Z80.3 FAMILY HISTORY OF BREAST CANCER: ICD-10-CM

## 2025-06-04 PROCEDURE — 77063 BREAST TOMOSYNTHESIS BI: CPT

## 2025-06-04 PROCEDURE — 77067 SCR MAMMO BI INCL CAD: CPT

## 2025-07-07 ENCOUNTER — TELEPHONE (OUTPATIENT)
Dept: FAMILY MEDICINE CLINIC | Facility: CLINIC | Age: 30
End: 2025-07-07
Payer: COMMERCIAL

## 2025-07-07 NOTE — TELEPHONE ENCOUNTER
Caller: Katty Love    Relationship to patient: Self    Best call back number: [phone]     Patient is needing: PATIENT STATES SHE IS GETTING SUNBURNED AND BELIEVES IT IS THE MEDICATION hydroCHLOROthiazide 25 MG tablet .  PATIENT STATES PROVIDER WANTED TO KNOW IF THIS HAS CONTINUED AND PATIENT STATES IT HAS